# Patient Record
Sex: MALE | Race: WHITE
[De-identification: names, ages, dates, MRNs, and addresses within clinical notes are randomized per-mention and may not be internally consistent; named-entity substitution may affect disease eponyms.]

---

## 2021-05-22 ENCOUNTER — HOSPITAL ENCOUNTER (OUTPATIENT)
Age: 71
End: 2021-05-22
Payer: MEDICARE

## 2021-05-22 DIAGNOSIS — M17.12: ICD-10-CM

## 2021-05-22 DIAGNOSIS — M25.462: ICD-10-CM

## 2021-05-22 DIAGNOSIS — M25.562: Primary | ICD-10-CM

## 2021-05-22 PROCEDURE — 73562 X-RAY EXAM OF KNEE 3: CPT

## 2022-01-25 ENCOUNTER — OFFICE VISIT (OUTPATIENT)
Dept: URBAN - METROPOLITAN AREA CLINIC 46 | Facility: CLINIC | Age: 72
End: 2022-01-25
Payer: MEDICARE

## 2022-01-25 DIAGNOSIS — H35.373 PUCKERING OF MACULA, BILATERAL: Primary | ICD-10-CM

## 2022-01-25 DIAGNOSIS — H35.372 PUCKERING OF MACULA, LEFT EYE: ICD-10-CM

## 2022-01-25 DIAGNOSIS — H40.013 OPEN ANGLE WITH BORDERLINE FINDINGS, LOW RISK, BILATERAL: ICD-10-CM

## 2022-01-25 DIAGNOSIS — H25.13 AGE-RELATED NUCLEAR CATARACT, BILATERAL: ICD-10-CM

## 2022-01-25 DIAGNOSIS — H25.042 POSTERIOR SUBCAPSULAR POLAR AGE RELATED CATARACT, LEFT EYE: ICD-10-CM

## 2022-01-25 PROCEDURE — 92134 CPTRZ OPH DX IMG PST SGM RTA: CPT | Performed by: OPHTHALMOLOGY

## 2022-01-25 PROCEDURE — 99214 OFFICE O/P EST MOD 30 MIN: CPT | Performed by: OPHTHALMOLOGY

## 2022-01-25 PROCEDURE — 92020 GONIOSCOPY: CPT | Performed by: OPHTHALMOLOGY

## 2022-01-25 ASSESSMENT — INTRAOCULAR PRESSURE
OS: 18
OD: 16

## 2022-02-25 ENCOUNTER — OFFICE VISIT (OUTPATIENT)
Dept: URBAN - METROPOLITAN AREA CLINIC 46 | Facility: CLINIC | Age: 72
End: 2022-02-25
Payer: MEDICARE

## 2022-02-25 PROCEDURE — 92083 EXTENDED VISUAL FIELD XM: CPT | Performed by: OPHTHALMOLOGY

## 2022-02-25 PROCEDURE — 99214 OFFICE O/P EST MOD 30 MIN: CPT | Performed by: OPHTHALMOLOGY

## 2022-02-25 PROCEDURE — 92133 CPTRZD OPH DX IMG PST SGM ON: CPT | Performed by: OPHTHALMOLOGY

## 2022-02-25 ASSESSMENT — INTRAOCULAR PRESSURE
OS: 18
OD: 16
OD: 15
OS: 14

## 2023-02-02 ENCOUNTER — OFFICE VISIT (OUTPATIENT)
Dept: URBAN - METROPOLITAN AREA CLINIC 46 | Facility: CLINIC | Age: 73
End: 2023-02-02
Payer: MEDICARE

## 2023-02-02 DIAGNOSIS — H40.013 OPEN ANGLE WITH BORDERLINE FINDINGS, LOW RISK, BILATERAL: ICD-10-CM

## 2023-02-02 DIAGNOSIS — H25.13 AGE-RELATED NUCLEAR CATARACT, BILATERAL: Primary | ICD-10-CM

## 2023-02-02 DIAGNOSIS — H52.223 REGULAR ASTIGMATISM, BILATERAL: ICD-10-CM

## 2023-02-02 DIAGNOSIS — H35.373 PUCKERING OF MACULA, BILATERAL: ICD-10-CM

## 2023-02-02 PROCEDURE — 99214 OFFICE O/P EST MOD 30 MIN: CPT | Performed by: OPHTHALMOLOGY

## 2023-02-02 PROCEDURE — 92136 OPHTHALMIC BIOMETRY: CPT | Performed by: OPHTHALMOLOGY

## 2023-02-02 ASSESSMENT — INTRAOCULAR PRESSURE
OD: 14
OS: 12

## 2023-02-02 NOTE — IMPRESSION/PLAN
Impression: Age-related nuclear cataract, bilateral: H25.13. Plan: Patient advised there is a cataract, and it is affecting their visual functioning. They may proceed with surgery. They were also advised that having cataract surgery does not mean they will not need to use glasses or contact lenses. Reviewed cataract surgery options with patient. Patient is a candidate for LensX, ORA, or upgraded lens. 

[Cat sx OU, OS 1st then OD standard lens and may consider AK/ORA with FEMTO, Aim plano; tx w/ dextenza if INS allows if not will proceed with pre and post-op surgical drops]

## 2023-02-02 NOTE — IMPRESSION/PLAN
Impression: Regular astigmatism, bilateral: H52.223.  Plan: [Cat sx OU, OS 1st then OD standard lens and may consider AK/ORA with FEMTO, Aim plano; tx w/ dextenza if INS allows if not will proceed with pre and post-op surgical drops]

## 2023-03-20 ENCOUNTER — OFFICE VISIT (OUTPATIENT)
Dept: URBAN - METROPOLITAN AREA CLINIC 50 | Facility: CLINIC | Age: 73
End: 2023-03-20
Payer: MEDICARE

## 2023-03-20 DIAGNOSIS — H18.232 SECONDARY CORNEAL EDEMA, LEFT EYE: ICD-10-CM

## 2023-03-20 DIAGNOSIS — H52.221 REGULAR ASTIGMATISM, RIGHT EYE: ICD-10-CM

## 2023-03-20 DIAGNOSIS — H25.11 AGE-RELATED NUCLEAR CATARACT, RIGHT EYE: Primary | ICD-10-CM

## 2023-03-20 PROCEDURE — 99213 OFFICE O/P EST LOW 20 MIN: CPT | Performed by: OPHTHALMOLOGY

## 2023-03-20 RX ORDER — PREDNISOLONE ACETATE 10 MG/ML
1 % SUSPENSION/ DROPS OPHTHALMIC
Qty: 10 | Refills: 0 | Status: ACTIVE
Start: 2023-03-20

## 2023-03-20 ASSESSMENT — INTRAOCULAR PRESSURE
OD: 16
OS: 16

## 2023-03-20 NOTE — IMPRESSION/PLAN
Impression: Age-related nuclear cataract, right eye: H25.11. Plan: Patient advised there is a cataract, and it is affecting their visual functioning. They may proceed with surgery. They were also advised that having cataract surgery does not mean they will not need to use glasses or contact lenses. Reviewed cataract surgery options with patient. Patient is a candidate for LensX, ORA, or upgraded lens. 

[Cat sx  OD 2nd eye standard or premium lens with AK/ORA/FEMTO, Aim plano; tx w/ dextenza ]

## 2023-03-20 NOTE — IMPRESSION/PLAN
Impression: Regular astigmatism, right eye: H52.221.  Plan: [Cat sx  OD 2nd eye standard or premium lens with AK/ORA/FEMTO, Aim plano; tx w/ dextenza ]

## 2023-03-20 NOTE — IMPRESSION/PLAN
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 Plan: Patient was advised to start Prednisolone qid for OS for mild swelling, will continue to monitor.

## 2023-03-28 ENCOUNTER — PROCEDURE (OUTPATIENT)
Dept: URBAN - METROPOLITAN AREA SURGERY 25 | Facility: SURGERY | Age: 73
End: 2023-03-28
Payer: MEDICARE

## 2023-03-28 PROCEDURE — PR3FY PR3FY: CUSTOM | Performed by: OPHTHALMOLOGY

## 2023-03-28 PROCEDURE — 66984 XCAPSL CTRC RMVL W/O ECP: CPT | Performed by: OPHTHALMOLOGY

## 2023-03-29 ENCOUNTER — POST-OPERATIVE VISIT (OUTPATIENT)
Dept: URBAN - METROPOLITAN AREA CLINIC 46 | Facility: CLINIC | Age: 73
End: 2023-03-29
Payer: MEDICARE

## 2023-03-29 DIAGNOSIS — Z96.1 PRESENCE OF INTRAOCULAR LENS: Primary | ICD-10-CM

## 2023-03-29 PROCEDURE — 99024 POSTOP FOLLOW-UP VISIT: CPT | Performed by: OPHTHALMOLOGY

## 2023-03-29 RX ORDER — NEOMYCIN SULFATE, POLYMYXIN B SULFATE AND DEXAMETHASONE 3.5; 10000; 1 MG/ML; [USP'U]/ML; MG/ML
SUSPENSION OPHTHALMIC
Qty: 5 | Refills: 0 | Status: ACTIVE
Start: 2023-03-29

## 2023-03-29 ASSESSMENT — INTRAOCULAR PRESSURE: OD: 13

## 2023-03-29 NOTE — IMPRESSION/PLAN
Impression: S/P CE/Standard IOL/ ORA OD - 1 Day. Presence of intraocular lens  Z96.1. Excellent post op course   Post operative instructions reviewed - Condition is improving - Plan: Good post op result. Return in one month for final check of surgery and refraction if desired. 

Begin Maxitrol eye drops: Instill 1 drop in the left eye three times a day

## 2023-10-20 ENCOUNTER — TECH ONLY (OUTPATIENT)
Dept: URBAN - METROPOLITAN AREA CLINIC 46 | Facility: CLINIC | Age: 73
End: 2023-10-20
Payer: MEDICARE

## 2023-10-20 DIAGNOSIS — H40.013 OPEN ANGLE WITH BORDERLINE FINDINGS, LOW RISK, BILATERAL: Primary | ICD-10-CM

## 2023-10-23 ENCOUNTER — OFFICE VISIT (OUTPATIENT)
Dept: URBAN - METROPOLITAN AREA CLINIC 50 | Facility: CLINIC | Age: 73
End: 2023-10-23
Payer: MEDICARE

## 2023-10-23 DIAGNOSIS — H40.013 OPEN ANGLE WITH BORDERLINE FINDINGS, LOW RISK, BILATERAL: Primary | ICD-10-CM

## 2023-10-23 DIAGNOSIS — Z96.1 PRESENCE OF INTRAOCULAR LENS: ICD-10-CM

## 2023-10-23 DIAGNOSIS — H35.373 PUCKERING OF MACULA, BILATERAL: ICD-10-CM

## 2023-10-23 PROCEDURE — 92134 CPTRZ OPH DX IMG PST SGM RTA: CPT | Performed by: OPHTHALMOLOGY

## 2023-10-23 PROCEDURE — 92133 CPTRZD OPH DX IMG PST SGM ON: CPT | Performed by: OPHTHALMOLOGY

## 2023-10-23 PROCEDURE — 92083 EXTENDED VISUAL FIELD XM: CPT | Performed by: OPHTHALMOLOGY

## 2023-10-23 PROCEDURE — 76514 ECHO EXAM OF EYE THICKNESS: CPT | Performed by: OPHTHALMOLOGY

## 2023-10-23 PROCEDURE — 99214 OFFICE O/P EST MOD 30 MIN: CPT | Performed by: OPHTHALMOLOGY

## 2023-10-23 ASSESSMENT — INTRAOCULAR PRESSURE
OS: 16
OD: 16